# Patient Record
Sex: FEMALE | Race: BLACK OR AFRICAN AMERICAN | NOT HISPANIC OR LATINO | Employment: UNEMPLOYED | URBAN - NONMETROPOLITAN AREA
[De-identification: names, ages, dates, MRNs, and addresses within clinical notes are randomized per-mention and may not be internally consistent; named-entity substitution may affect disease eponyms.]

---

## 2022-11-22 ENCOUNTER — APPOINTMENT (OUTPATIENT)
Dept: ULTRASOUND IMAGING | Facility: HOSPITAL | Age: 27
End: 2022-11-22

## 2022-11-22 ENCOUNTER — HOSPITAL ENCOUNTER (EMERGENCY)
Facility: HOSPITAL | Age: 27
Discharge: HOME | End: 2022-11-22
Attending: EMERGENCY MEDICINE | Admitting: FAMILY MEDICINE

## 2022-11-22 VITALS
OXYGEN SATURATION: 98 % | TEMPERATURE: 98.9 F | BODY MASS INDEX: 23.39 KG/M2 | SYSTOLIC BLOOD PRESSURE: 115 MMHG | RESPIRATION RATE: 18 BRPM | HEART RATE: 77 BPM | DIASTOLIC BLOOD PRESSURE: 71 MMHG | WEIGHT: 137 LBS | HEIGHT: 64 IN

## 2022-11-22 DIAGNOSIS — R11.10 HYPEREMESIS: ICD-10-CM

## 2022-11-22 DIAGNOSIS — F12.10 CANNABIS ABUSE: ICD-10-CM

## 2022-11-22 DIAGNOSIS — Z34.90 EARLY STAGE OF PREGNANCY: Primary | ICD-10-CM

## 2022-11-22 DIAGNOSIS — R11.2 NAUSEA AND VOMITING, UNSPECIFIED VOMITING TYPE: ICD-10-CM

## 2022-11-22 LAB
ALBUMIN SERPL-MCNC: 4.8 G/DL (ref 3.5–5.2)
ALBUMIN/GLOB SERPL: 1.5 G/DL
ALP SERPL-CCNC: 72 U/L (ref 39–117)
ALT SERPL W P-5'-P-CCNC: 11 U/L (ref 1–33)
AMPHET+METHAMPHET UR QL: NEGATIVE
AMPHETAMINES UR QL: NEGATIVE
ANION GAP SERPL CALCULATED.3IONS-SCNC: 19 MMOL/L (ref 5–15)
AST SERPL-CCNC: 15 U/L (ref 1–32)
B-HCG UR QL: POSITIVE
BACTERIA UR QL AUTO: ABNORMAL /HPF
BARBITURATES UR QL SCN: NEGATIVE
BASOPHILS # BLD AUTO: 0.02 10*3/MM3 (ref 0–0.2)
BASOPHILS NFR BLD AUTO: 0.2 % (ref 0–1.5)
BENZODIAZ UR QL SCN: NEGATIVE
BILIRUB SERPL-MCNC: 0.5 MG/DL (ref 0–1.2)
BILIRUB UR QL STRIP: NEGATIVE
BUN SERPL-MCNC: 10 MG/DL (ref 6–20)
BUN/CREAT SERPL: 14.1 (ref 7–25)
BUPRENORPHINE SERPL-MCNC: NEGATIVE NG/ML
CALCIUM SPEC-SCNC: 10.2 MG/DL (ref 8.6–10.5)
CANNABINOIDS SERPL QL: POSITIVE
CHLORIDE SERPL-SCNC: 98 MMOL/L (ref 98–107)
CLARITY UR: CLEAR
CO2 SERPL-SCNC: 18 MMOL/L (ref 22–29)
COCAINE UR QL: NEGATIVE
COLOR UR: YELLOW
CREAT SERPL-MCNC: 0.71 MG/DL (ref 0.57–1)
DEPRECATED RDW RBC AUTO: 39.7 FL (ref 37–54)
EGFRCR SERPLBLD CKD-EPI 2021: 118.9 ML/MIN/1.73
EOSINOPHIL # BLD AUTO: 0.03 10*3/MM3 (ref 0–0.4)
EOSINOPHIL NFR BLD AUTO: 0.3 % (ref 0.3–6.2)
ERYTHROCYTE [DISTWIDTH] IN BLOOD BY AUTOMATED COUNT: 12.9 % (ref 12.3–15.4)
GLOBULIN UR ELPH-MCNC: 3.2 GM/DL
GLUCOSE SERPL-MCNC: 94 MG/DL (ref 65–99)
GLUCOSE UR STRIP-MCNC: NEGATIVE MG/DL
HCG INTACT+B SERPL-ACNC: NORMAL MIU/ML
HCT VFR BLD AUTO: 43.8 % (ref 34–46.6)
HGB BLD-MCNC: 14.6 G/DL (ref 12–15.9)
HGB UR QL STRIP.AUTO: NEGATIVE
HYALINE CASTS UR QL AUTO: ABNORMAL /LPF
IMM GRANULOCYTES # BLD AUTO: 0.03 10*3/MM3 (ref 0–0.05)
IMM GRANULOCYTES NFR BLD AUTO: 0.3 % (ref 0–0.5)
KETONES UR QL STRIP: ABNORMAL
LEUKOCYTE ESTERASE UR QL STRIP.AUTO: NEGATIVE
LYMPHOCYTES # BLD AUTO: 2.31 10*3/MM3 (ref 0.7–3.1)
LYMPHOCYTES NFR BLD AUTO: 24.7 % (ref 19.6–45.3)
MCH RBC QN AUTO: 28.2 PG (ref 26.6–33)
MCHC RBC AUTO-ENTMCNC: 33.3 G/DL (ref 31.5–35.7)
MCV RBC AUTO: 84.7 FL (ref 79–97)
METHADONE UR QL SCN: NEGATIVE
MONOCYTES # BLD AUTO: 1.13 10*3/MM3 (ref 0.1–0.9)
MONOCYTES NFR BLD AUTO: 12.1 % (ref 5–12)
NEUTROPHILS NFR BLD AUTO: 5.85 10*3/MM3 (ref 1.7–7)
NEUTROPHILS NFR BLD AUTO: 62.4 % (ref 42.7–76)
NITRITE UR QL STRIP: NEGATIVE
NRBC BLD AUTO-RTO: 0 /100 WBC (ref 0–0.2)
OPIATES UR QL: NEGATIVE
OXYCODONE UR QL SCN: NEGATIVE
PCP UR QL SCN: NEGATIVE
PH UR STRIP.AUTO: 5.5 [PH] (ref 5–8)
PLATELET # BLD AUTO: 390 10*3/MM3 (ref 140–450)
PMV BLD AUTO: 9.2 FL (ref 6–12)
POTASSIUM SERPL-SCNC: 3.9 MMOL/L (ref 3.5–5.2)
PROPOXYPH UR QL: NEGATIVE
PROT SERPL-MCNC: 8 G/DL (ref 6–8.5)
PROT UR QL STRIP: ABNORMAL
RBC # BLD AUTO: 5.17 10*6/MM3 (ref 3.77–5.28)
RBC # UR STRIP: ABNORMAL /HPF
REF LAB TEST METHOD: ABNORMAL
SODIUM SERPL-SCNC: 135 MMOL/L (ref 136–145)
SP GR UR STRIP: >=1.03 (ref 1–1.03)
SQUAMOUS #/AREA URNS HPF: ABNORMAL /HPF
TRICYCLICS UR QL SCN: NEGATIVE
UROBILINOGEN UR QL STRIP: ABNORMAL
WBC # UR STRIP: ABNORMAL /HPF
WBC NRBC COR # BLD: 9.37 10*3/MM3 (ref 3.4–10.8)

## 2022-11-22 PROCEDURE — 96374 THER/PROPH/DIAG INJ IV PUSH: CPT

## 2022-11-22 PROCEDURE — 80053 COMPREHEN METABOLIC PANEL: CPT | Performed by: PHYSICIAN ASSISTANT

## 2022-11-22 PROCEDURE — 80306 DRUG TEST PRSMV INSTRMNT: CPT | Performed by: PHYSICIAN ASSISTANT

## 2022-11-22 PROCEDURE — 81001 URINALYSIS AUTO W/SCOPE: CPT | Performed by: PHYSICIAN ASSISTANT

## 2022-11-22 PROCEDURE — 99283 EMERGENCY DEPT VISIT LOW MDM: CPT

## 2022-11-22 PROCEDURE — 81025 URINE PREGNANCY TEST: CPT | Performed by: PHYSICIAN ASSISTANT

## 2022-11-22 PROCEDURE — 84702 CHORIONIC GONADOTROPIN TEST: CPT | Performed by: PHYSICIAN ASSISTANT

## 2022-11-22 PROCEDURE — 76817 TRANSVAGINAL US OBSTETRIC: CPT

## 2022-11-22 PROCEDURE — 85025 COMPLETE CBC W/AUTO DIFF WBC: CPT | Performed by: PHYSICIAN ASSISTANT

## 2022-11-22 PROCEDURE — 25010000002 ONDANSETRON PER 1 MG: Performed by: PHYSICIAN ASSISTANT

## 2022-11-22 PROCEDURE — 96361 HYDRATE IV INFUSION ADD-ON: CPT

## 2022-11-22 RX ORDER — ONDANSETRON 2 MG/ML
4 INJECTION INTRAMUSCULAR; INTRAVENOUS ONCE
Status: COMPLETED | OUTPATIENT
Start: 2022-11-22 | End: 2022-11-22

## 2022-11-22 RX ORDER — ONDANSETRON 4 MG/1
4 TABLET, FILM COATED ORAL EVERY 6 HOURS PRN
Qty: 12 TABLET | Refills: 0 | Status: SHIPPED | OUTPATIENT
Start: 2022-11-22 | End: 2022-11-22

## 2022-11-22 RX ORDER — DOXYLAMINE SUCCINATE AND PYRIDOXINE HYDROCHLORIDE, DELAYED RELEASE TABLETS 10 MG/10 MG 10; 10 MG/1; MG/1
2 TABLET, DELAYED RELEASE ORAL NIGHTLY PRN
Qty: 7 TABLET | Refills: 0 | Status: SHIPPED | OUTPATIENT
Start: 2022-11-22 | End: 2022-11-29

## 2022-11-22 RX ADMIN — ONDANSETRON 4 MG: 2 INJECTION INTRAMUSCULAR; INTRAVENOUS at 17:28

## 2022-11-22 RX ADMIN — SODIUM CHLORIDE 1000 ML: 9 INJECTION, SOLUTION INTRAVENOUS at 17:28

## 2022-11-22 NOTE — ED PROVIDER NOTES
"Subjective  History of Present Illness:    Chief Complaint: Nausea vomiting  History of Present Illness: 28-year-old female presents with nausea vomiting, and early pregnancy.  She has been having nausea vomiting for 2 weeks.  She has had 3 pregnancy test at home, but has not had an OB appointment yet.  She denies abdominal pain, no vaginal bleeding.  She states she has vomited multiple times a day sometimes up to 20 times.  Onset: Gradual  Duration: 2 weeks  Exacerbating / Alleviating factors: Eating and drinking, pregnancy  Associated symptoms: Early pregnancy      Nurses Notes reviewed and agree, including vitals, allergies, social history and prior medical history.     REVIEW OF SYSTEMS: All systems reviewed and not pertinent unless noted.    Review of Systems   Gastrointestinal: Positive for nausea and vomiting.   All other systems reviewed and are negative.      No past medical history on file.    Allergies:    Patient has no known allergies.      No past surgical history on file.      Social History     Socioeconomic History   • Marital status: Single         No family history on file.    Objective  Physical Exam:  /71   Pulse 77   Temp 98.9 °F (37.2 °C) (Oral)   Resp 18   Ht 162.6 cm (64\")   Wt 62.1 kg (137 lb)   LMP 10/12/2022   SpO2 98%   BMI 23.52 kg/m²      Physical Exam  Vitals and nursing note reviewed.   Constitutional:       Appearance: She is well-developed.   HENT:      Head: Normocephalic and atraumatic.      Mouth/Throat:      Mouth: Mucous membranes are dry.   Eyes:      Extraocular Movements: Extraocular movements intact.   Cardiovascular:      Rate and Rhythm: Normal rate and regular rhythm.   Pulmonary:      Effort: Pulmonary effort is normal.      Breath sounds: Normal breath sounds.   Abdominal:      Palpations: Abdomen is soft.   Musculoskeletal:         General: Normal range of motion.      Cervical back: Normal range of motion and neck supple.   Skin:     General: Skin is " warm and dry.   Neurological:      Mental Status: She is alert and oriented to person, place, and time.      Deep Tendon Reflexes: Reflexes are normal and symmetric.           Procedures    ED Course:    ED Course as of 11/22/22 1944 Tue Nov 22, 2022 1940 discussed the case with Dr. Rodriguez, patient needs to call the office for earlier appointment. [CS]      ED Course User Index  [CS] Guillermo Peck Jr., PASHARAL       Lab Results (last 24 hours)     Procedure Component Value Units Date/Time    Urinalysis With Culture If Indicated - Urine, Clean Catch [[id number]]  (Abnormal) Collected: 11/22/22 1718    Specimen: Urine, Clean Catch Updated: 11/22/22 1730     Color, UA Yellow     Appearance, UA Clear     pH, UA 5.5     Specific Gravity, UA >=1.030     Glucose, UA Negative     Ketones, UA >=160 mg/dL (4+)     Bilirubin, UA Negative     Blood, UA Negative     Protein, UA 30 mg/dL (1+)     Leuk Esterase, UA Negative     Nitrite, UA Negative     Urobilinogen, UA 0.2 E.U./dL    Narrative:      In absence of clinical symptoms, the presence of pyuria, bacteria, and/or nitrites on the urinalysis result does not correlate with infection.    Pregnancy, Urine - Urine, Clean Catch [[id number]]  (Abnormal) Collected: 11/22/22 1718    Specimen: Urine, Clean Catch Updated: 11/22/22 1731     HCG, Urine QL Positive    Urinalysis, Microscopic Only - Urine, Clean Catch [[id number]]  (Abnormal) Collected: 11/22/22 1718    Specimen: Urine, Clean Catch Updated: 11/22/22 1738     RBC, UA None Seen /HPF      WBC, UA 0-2 /HPF      Comment: Urine culture not indicated.        Bacteria, UA Trace /HPF      Squamous Epithelial Cells, UA 3-6 /HPF      Hyaline Casts, UA None Seen /LPF      Methodology Manual Light Microscopy    Urine Drug Screen - Urine, Clean Catch [[id number]]  (Abnormal) Collected: 11/22/22 1718    Specimen: Urine, Clean Catch Updated: 11/22/22 1753     THC, Screen, Urine Positive     Phencyclidine (PCP), Urine Negative     Cocaine  Screen, Urine Negative     Methamphetamine, Ur Negative     Opiate Screen Negative     Amphetamine Screen, Urine Negative     Benzodiazepine Screen, Urine Negative     Tricyclic Antidepressants Screen Negative     Methadone Screen, Urine Negative     Barbiturates Screen, Urine Negative     Oxycodone Screen, Urine Negative     Propoxyphene Screen Negative     Buprenorphine, Screen, Urine Negative    Narrative:      Limitations of this procedure include the possibility of false positives due to interfering substances in the urine sample. Clinical data should be correlated with any questionable result. Positive results should be considered Presumptive Positive until results are confirmed with another methodology such as HPLC or GCMS.    Comprehensive Metabolic Panel [[id number]]  (Abnormal) Collected: 11/22/22 1728    Specimen: Blood Updated: 11/22/22 1758     Glucose 94 mg/dL      BUN 10 mg/dL      Creatinine 0.71 mg/dL      Sodium 135 mmol/L      Potassium 3.9 mmol/L      Chloride 98 mmol/L      CO2 18.0 mmol/L      Calcium 10.2 mg/dL      Total Protein 8.0 g/dL      Albumin 4.80 g/dL      ALT (SGPT) 11 U/L      AST (SGOT) 15 U/L      Alkaline Phosphatase 72 U/L      Total Bilirubin 0.5 mg/dL      Globulin 3.2 gm/dL      A/G Ratio 1.5 g/dL      BUN/Creatinine Ratio 14.1     Anion Gap 19.0 mmol/L      eGFR 118.9 mL/min/1.73      Comment: National Kidney Foundation and American Society of Nephrology (ASN) Task Force recommended calculation based on the Chronic Kidney Disease Epidemiology Collaboration (CKD-EPI) equation refit without adjustment for race.       Narrative:      GFR Normal >60  Chronic Kidney Disease <60  Kidney Failure <15      CBC Auto Differential [[id number]]  (Abnormal) Collected: 11/22/22 1728    Specimen: Blood Updated: 11/22/22 1734     WBC 9.37 10*3/mm3      RBC 5.17 10*6/mm3      Hemoglobin 14.6 g/dL      Hematocrit 43.8 %      MCV 84.7 fL      MCH 28.2 pg      MCHC 33.3 g/dL      RDW 12.9 %       RDW-SD 39.7 fl      MPV 9.2 fL      Platelets 390 10*3/mm3      Neutrophil % 62.4 %      Lymphocyte % 24.7 %      Monocyte % 12.1 %      Eosinophil % 0.3 %      Basophil % 0.2 %      Immature Grans % 0.3 %      Neutrophils, Absolute 5.85 10*3/mm3      Lymphocytes, Absolute 2.31 10*3/mm3      Monocytes, Absolute 1.13 10*3/mm3      Eosinophils, Absolute 0.03 10*3/mm3      Basophils, Absolute 0.02 10*3/mm3      Immature Grans, Absolute 0.03 10*3/mm3      nRBC 0.0 /100 WBC     hCG, Quantitative, Pregnancy [[id number]] Collected: 11/22/22 1728    Specimen: Blood Updated: 11/22/22 1845     HCG Quantitative 69,839.00 mIU/mL     Narrative:      HCG Ranges by Gestational Age    Females - non-pregnant premenopausal   </= 1mIU/mL HCG  Females - postmenopausal               </= 7mIU/mL HCG    3 Weeks         5.8 -    71.2 mIU/mL  4 Weeks         9.5 -     750 mIU/mL  5 Weeks         217 -   7,138 mIU/mL  6 Weeks         158 -  31,795 mIU/mL  7 Weeks       3,697 - 163,563 mIU/mL  8 Weeks      32,065 - 149,571 mIU/mL  9 Weeks      63,803 - 151,410 mIU/mL  10 Weeks     46,509 - 186,977 mIU/mL  12 Weeks     27,832 - 210,612 mIU/mL  14 Weeks     13,950 -  62,530 mIU/mL  15 Weeks     12,039 -  70,971 mIU/mL  16 Weeks      9,040 -  56,451 mIU/mL  17 Weeks      8,175 -  55,868 mIU/mL  18 Weeks      8,099 -  58,176 mIU/mL  Results may be falsely decreased if patient taking Biotin.             No radiology results from the last 24 hrs       MDM  Number of Diagnoses or Management Options  Cannabis abuse: new and requires workup  Early stage of pregnancy: new and requires workup  Hyperemesis: new and requires workup  Nausea and vomiting, unspecified vomiting type: new and requires workup     Amount and/or Complexity of Data Reviewed  Clinical lab tests: reviewed  Tests in the radiology section of CPT®: reviewed  Review and summarize past medical records: yes  Discuss the patient with other providers: yes    Risk of Complications, Morbidity,  and/or Mortality  Presenting problems: moderate  Diagnostic procedures: moderate  Management options: moderate  General comments: 27-year-old female presents with nausea vomiting for 2 weeks in early pregnancy, labs ordered, IV ordered, given normal saline and antiemetics.  Reviewed labs and interpreted myself, discussed with the patient at bedside.  Transvaginal ultrasound also ordered, reviewed the results and discussed with the patient at bedside discussed the results of the ultrasound with the patient at the bedside as well as labs, patient had a low CO2, and elevated ketones in urine was likely for malnutrition and dehydration, she did receive a liter of fluids.  Discussed the case with Dr. Rodriguez, she recommended the patient follow-up next week for an earlier appointment she feels much better, educated her on hyperemesis cannabis, and the effects of marijuana during pregnancy.  She is stable and can be discharged    Patient Progress  Patient progress: stable        Final diagnoses:   Early stage of pregnancy   Hyperemesis   Nausea and vomiting, unspecified vomiting type   Cannabis abuse        Guillermo Peck Jr., PA-C  11/22/22 1944

## 2023-01-09 LAB
C TRACH RRNA SPEC DONR QL NAA+PROBE: NEGATIVE
EXTERNAL ABO GROUPING: NORMAL
EXTERNAL ANTIBODY SCREEN: NORMAL
EXTERNAL HEMATOCRIT: 32 %
EXTERNAL HEMOGLOBIN: 10.9 G/DL
EXTERNAL HEPATITIS B SURFACE ANTIGEN: NEGATIVE
EXTERNAL PLATELET COUNT: 355 K/ΜL
EXTERNAL RH FACTOR: POSITIVE
EXTERNAL SYPHILIS RPR SCREEN: NORMAL
HCV AB S/CO SERPL IA: NEGATIVE
HIV 1+2 AB+HIV1 P24 AG SERPL QL IA: NORMAL
N GONORRHOEA DNA SPEC QL NAA+PROBE: NEGATIVE
RUBV IGG SERPL IA-ACNC: >10

## 2023-02-28 ENCOUNTER — INITIAL PRENATAL (OUTPATIENT)
Dept: OBSTETRICS AND GYNECOLOGY | Facility: CLINIC | Age: 28
End: 2023-02-28
Payer: COMMERCIAL

## 2023-02-28 VITALS — SYSTOLIC BLOOD PRESSURE: 110 MMHG | DIASTOLIC BLOOD PRESSURE: 72 MMHG | WEIGHT: 156 LBS | BODY MASS INDEX: 26.78 KG/M2

## 2023-02-28 DIAGNOSIS — Z30.46 ENCOUNTER FOR NEXPLANON REMOVAL: ICD-10-CM

## 2023-02-28 DIAGNOSIS — Z87.59 HISTORY OF PRIOR PREGNANCY WITH IUGR NEWBORN: ICD-10-CM

## 2023-02-28 DIAGNOSIS — Z34.92 PRENATAL CARE IN SECOND TRIMESTER: Primary | ICD-10-CM

## 2023-02-28 PROCEDURE — 11982 REMOVE DRUG IMPLANT DEVICE: CPT | Performed by: OBSTETRICS & GYNECOLOGY

## 2023-02-28 PROCEDURE — 99204 OFFICE O/P NEW MOD 45 MIN: CPT | Performed by: OBSTETRICS & GYNECOLOGY

## 2023-02-28 RX ORDER — PRENATAL VIT NO.126/IRON/FOLIC 28MG-0.8MG
TABLET ORAL DAILY
COMMUNITY

## 2023-03-03 PROBLEM — Z87.59 HISTORY OF PRIOR PREGNANCY WITH IUGR NEWBORN: Status: ACTIVE | Noted: 2023-03-03

## 2023-03-03 NOTE — PROGRESS NOTES
New Pregnancy Visit    Subjective   Chief Complaint   Patient presents with   • Initial Prenatal Visit     Transfer of care, Anatomy scan done today. No complaints       Say Bonilla is a 27 y.o. year old .  Patient's last menstrual period was 10/12/2022.  She presents to initiate prenatal care with our group today.     [place]  Anatomy ultrasound today. Uncomplicated prenatal care thus far.  Pregnancy is dated by 7-week ultrasound in our ED.  She does have a Nexplanon in place on the left that is deep and has been present for roughly 10 years at this point.  Previous term vaginal birth in .  That baby had growth restriction weighing 5 pounds 6 ounces.    Social History    Tobacco Use      Smoking status: Every Day        Packs/day: 0.25        Types: Cigarettes      Smokeless tobacco: Never      Current Outpatient Medications on File Prior to Visit   Medication Sig Dispense Refill   • prenatal vitamin (prenatal, CLASSIC, vitamin) tablet Take  by mouth Daily.       No current facility-administered medications on file prior to visit.          Objective   /72   Wt 70.8 kg (156 lb)   LMP 10/12/2022   BMI 26.78 kg/m²   Physical Exam:  Normal, gestational age-appropriate exam today        Medical Decision Making:    Lab Review:   Prenatal labs reviewed    Note Review:  None    Imaging Review:  Pelvic ultrasound reports 22 and today  IUP at 20+6 weeks. Anatomy was completely cleared today and all organ systems were found to be normal in appearance. EFW 398g(57%) AC 36%. Cephalic presentation. Placenta is anterior. Amniotic fluid and fetal heart rate are normal.  Assessment   1. IUP at 20+6 weeks  2. Supervision of high risk pregnancy   3. Transfer of prenatal care  4. Deeply embedded Nexplanon past due for removal, removed today  5. History of IUGR with previous pregnancy     Plan    1. The problem list for pregnancy was initiated today  2. Tests/Orders/Rx  for today:  Orders Placed This Encounter   Procedures   • Chlamydia trachomatis, Neisseria gonorrhoeae, PCR - ,     This is an external result entered through the Results Console.     Order Specific Question:   Release to patient     Answer:   Routine Release   • HIV-1 / O / 2 Ag / Antibody 4th Generation     This is an external result entered through the Results Console.     Order Specific Question:   Release to patient     Answer:   Routine Release   • Obstetric Panel     This is an external result entered through the Results Console.     Order Specific Question:   Release to patient     Answer:   Routine Release   • Hepatitis C Antibody     This is an external result entered through the Results Console.     Order Specific Question:   Release to patient     Answer:   Routine Release       Medication Management: None    3. Testing for GC / Chlamydia / trichomonas was recently done and will not need to be repeated  4. Genetic testing reviewed: None  5. Information reviewed: smoking in pregnancy, exercise in pregnancy, nutrition in pregnancy, weight gain in pregnancy, work and travel restrictions during pregnancy, list of OTC medications acceptable in pregnancy and call coverage groups     Nexplanon Removal     Patient's last menstrual period was 10/12/2022.    Date of procedure:  02/28/2023    Risks and benefits discussed? yes  All questions answered? yes  Consents given by the patient  Written consent obtained? yes    Local anesthesia used:  yes - 2 cc's of  Meds; anesthesia local: 1% lidocaine with epinephrine    Procedure documentation:    The Nexplanon was barely palpable in the left arm. The upper left arm (non-dominant) was marked at the intended site of removal. An alcohol wipe was used to cleanse the skin.  Local anesthesia was injected. The arm was further cleaned with betadine. A vertical incision was created at the distal tip of the implant.  The implant was removed intact with some  difficulty.    Steri-strips were then placed across the site of insertion and the arm was wrapped.    She tolerated the procedure well.  There were no complications.  EBL was minimal.    Post procedure instructions: Remove the wrapping in 24 hours and the steri-strips in 5 days.  The patient has been advised to contact the office if she develops fever, redness, swelling, pain, or discharge occurs at the procedure site.      Follow up: 4 week(s)    Angel Denney MD  Obstetrics and Gynecology  [facility]

## 2023-03-16 ENCOUNTER — REFERRAL TRIAGE (OUTPATIENT)
Dept: LABOR AND DELIVERY | Facility: HOSPITAL | Age: 28
End: 2023-03-16
Payer: COMMERCIAL

## 2023-03-20 ENCOUNTER — ROUTINE PRENATAL (OUTPATIENT)
Dept: OBSTETRICS AND GYNECOLOGY | Facility: CLINIC | Age: 28
End: 2023-03-20
Payer: COMMERCIAL

## 2023-03-20 VITALS — WEIGHT: 160 LBS | SYSTOLIC BLOOD PRESSURE: 110 MMHG | BODY MASS INDEX: 27.46 KG/M2 | DIASTOLIC BLOOD PRESSURE: 64 MMHG

## 2023-03-20 DIAGNOSIS — Z34.82 ENCOUNTER FOR SUPERVISION OF OTHER NORMAL PREGNANCY IN SECOND TRIMESTER: Primary | ICD-10-CM

## 2023-03-20 PROCEDURE — 99213 OFFICE O/P EST LOW 20 MIN: CPT | Performed by: MIDWIFE

## 2023-03-20 NOTE — PROGRESS NOTES
Chief Complaint   Patient presents with   • Routine Prenatal Visit     No Complaints/concerns        HPI: Say is a  currently at 23w6d here for prenatal visit who reports the following:  Baby is active. She denies any problems                EXAM:     Vitals:    23 1336   BP: 110/64      Abdomen:   See prenatal flowsheet as noted and reviewed, soft, nontender   Pelvic:  See prenatal flowsheet as noted and reviewed   Urine:  See prenatal flowsheet as noted and reviewed    Lab Results   Component Value Date    ABO O 2023    RH Positive 2023    ABSCRN Normal 2023       MDM:  Impression: Supervision of low risk pregnancy   Tests done today: none   Topics discussed: kick counts and fetal movement  Reviewed OB labs   Tests next visit: GCT  HgB                RTO:                        4 weeks    This note was electronically signed.  Zulema Mandel, APRN  3/20/2023

## 2023-04-17 ENCOUNTER — ROUTINE PRENATAL (OUTPATIENT)
Dept: OBSTETRICS AND GYNECOLOGY | Facility: CLINIC | Age: 28
End: 2023-04-17
Payer: COMMERCIAL

## 2023-04-17 VITALS — WEIGHT: 158.8 LBS | DIASTOLIC BLOOD PRESSURE: 66 MMHG | BODY MASS INDEX: 27.26 KG/M2 | SYSTOLIC BLOOD PRESSURE: 108 MMHG

## 2023-04-17 DIAGNOSIS — Z3A.27 27 WEEKS GESTATION OF PREGNANCY: Primary | ICD-10-CM

## 2023-04-17 LAB
BASOPHILS # BLD AUTO: 0.01 10*3/MM3 (ref 0–0.2)
BASOPHILS NFR BLD AUTO: 0.1 % (ref 0–1.5)
EOSINOPHIL # BLD AUTO: 0.07 10*3/MM3 (ref 0–0.4)
EOSINOPHIL NFR BLD AUTO: 0.8 % (ref 0.3–6.2)
ERYTHROCYTE [DISTWIDTH] IN BLOOD BY AUTOMATED COUNT: 12.8 % (ref 12.3–15.4)
GLUCOSE 1H P 50 G GLC PO SERPL-MCNC: 192 MG/DL (ref 65–139)
HCT VFR BLD AUTO: 33.6 % (ref 34–46.6)
HGB BLD-MCNC: 11.3 G/DL (ref 12–15.9)
IMM GRANULOCYTES # BLD AUTO: 0.07 10*3/MM3 (ref 0–0.05)
IMM GRANULOCYTES NFR BLD AUTO: 0.8 % (ref 0–0.5)
LYMPHOCYTES # BLD AUTO: 2.08 10*3/MM3 (ref 0.7–3.1)
LYMPHOCYTES NFR BLD AUTO: 24.1 % (ref 19.6–45.3)
MCH RBC QN AUTO: 28.9 PG (ref 26.6–33)
MCHC RBC AUTO-ENTMCNC: 33.6 G/DL (ref 31.5–35.7)
MCV RBC AUTO: 85.9 FL (ref 79–97)
MONOCYTES # BLD AUTO: 0.67 10*3/MM3 (ref 0.1–0.9)
MONOCYTES NFR BLD AUTO: 7.8 % (ref 5–12)
NEUTROPHILS # BLD AUTO: 5.73 10*3/MM3 (ref 1.7–7)
NEUTROPHILS NFR BLD AUTO: 66.4 % (ref 42.7–76)
NRBC BLD AUTO-RTO: 0 /100 WBC (ref 0–0.2)
PLATELET # BLD AUTO: 267 10*3/MM3 (ref 140–450)
RBC # BLD AUTO: 3.91 10*6/MM3 (ref 3.77–5.28)
WBC # BLD AUTO: 8.63 10*3/MM3 (ref 3.4–10.8)

## 2023-04-17 RX ORDER — FERROUS SULFATE 325(65) MG
325 TABLET ORAL
Qty: 60 TABLET | Refills: 10 | Status: SHIPPED | OUTPATIENT
Start: 2023-04-17

## 2023-04-17 NOTE — PROGRESS NOTES
Chief Complaint   Patient presents with   • Routine Prenatal Visit     Prenatal visit with Glucola done today. No problems or concerns        HPI:   , 27w6d gestation reports doing well    ROS:  See Prenatal Episode/Flowsheet  /66   Wt 72 kg (158 lb 12.8 oz)   LMP 10/12/2022   BMI 27.26 kg/m²      EXAM:  EXTREMITIES:  No swelling-See Prenatal Episode/Flowsheet    ABDOMEN:  FHTs/Movement noted-See Prenatal Episode/Flowsheet    URINE GLUCOSE/PROTEIN:  See Prenatal Episode/Flowsheet    PELVIC EXAM:  See Prenatal Episode/Flowsheet  CV:  Lungs:  GYN:    MDM:    Lab Results   Component Value Date    HGB 10.9 2023    RUBELLAABIGG >10.00 2023    HEPBSAG Negative 2023    ABO O 2023    RH Positive 2023    ABSCRN Normal 2023    [id number] Non-Reactive 2023    HEPCVIRUSABY Negative 2023       U/S:US Ob 14 + Weeks Single or First Gestation (2023 14:09)      1. IUP 27w6d  2. Routine care   3. Glucola today  4. Supportive measures  5. 3 weeks

## 2023-04-17 NOTE — PROGRESS NOTES
Please send in glucometer with test strips and lancets, #120 each with 6 refills each.  Patient failed her sugar test.  She needs to start checking her blood sugars: Fasting, 1 hour after breakfast/lunch/dinner.  She needs to record these in a log-notebook.  She will need to come in in about a week to review her blood sugars.  Iron twice a day has been sent in for anemia.  Please get her in with diabetic counseling as well.  Thank you

## 2023-04-18 DIAGNOSIS — O24.410 DIET CONTROLLED GESTATIONAL DIABETES MELLITUS (GDM) IN SECOND TRIMESTER: Primary | ICD-10-CM

## 2023-04-18 RX ORDER — BLOOD-GLUCOSE METER
1 KIT MISCELLANEOUS DAILY
Qty: 1 EACH | Refills: 0 | Status: SHIPPED | OUTPATIENT
Start: 2023-04-18

## 2023-04-18 RX ORDER — SYRING-NEEDL,DISP,INSUL,0.3 ML 30 GX5/16"
1 SYRINGE, EMPTY DISPOSABLE MISCELLANEOUS 4 TIMES DAILY
Qty: 120 EACH | Refills: 2 | Status: SHIPPED | OUTPATIENT
Start: 2023-04-18

## 2023-04-24 ENCOUNTER — ROUTINE PRENATAL (OUTPATIENT)
Dept: OBSTETRICS AND GYNECOLOGY | Facility: CLINIC | Age: 28
End: 2023-04-24
Payer: COMMERCIAL

## 2023-04-24 VITALS — DIASTOLIC BLOOD PRESSURE: 60 MMHG | WEIGHT: 162.2 LBS | SYSTOLIC BLOOD PRESSURE: 110 MMHG | BODY MASS INDEX: 27.84 KG/M2

## 2023-04-24 DIAGNOSIS — O24.410 GDM (GESTATIONAL DIABETES MELLITUS), CLASS A1: ICD-10-CM

## 2023-04-24 DIAGNOSIS — R35.0 URINARY FREQUENCY: Primary | ICD-10-CM

## 2023-04-24 DIAGNOSIS — Z87.59 HISTORY OF PRIOR PREGNANCY WITH IUGR NEWBORN: ICD-10-CM

## 2023-04-24 LAB
BILIRUB BLD-MCNC: NEGATIVE MG/DL
CLARITY, POC: ABNORMAL
COLOR UR: YELLOW
GLUCOSE UR STRIP-MCNC: NEGATIVE MG/DL
KETONES UR QL: NEGATIVE
LEUKOCYTE EST, POC: NEGATIVE
NITRITE UR-MCNC: NEGATIVE MG/ML
PH UR: 8 [PH] (ref 5–8)
PROT UR STRIP-MCNC: ABNORMAL MG/DL
RBC # UR STRIP: NEGATIVE /UL
SP GR UR: 1.01 (ref 1–1.03)
UROBILINOGEN UR QL: NORMAL

## 2023-04-24 NOTE — PROGRESS NOTES
Chief Complaint   Patient presents with   • Routine Prenatal Visit     Prenatal visit with no problems or concerns.        HPI:   , 28w6d gestation reports doing well    ROS:  See Prenatal Episode/Flowsheet  /60   Wt 73.6 kg (162 lb 3.2 oz)   LMP 10/12/2022   BMI 27.84 kg/m²      EXAM:  EXTREMITIES:  No swelling-See Prenatal Episode/Flowsheet    ABDOMEN:  FHTs/Movement noted-See Prenatal Episode/Flowsheet    URINE GLUCOSE/PROTEIN:  See Prenatal Episode/Flowsheet    PELVIC EXAM:  See Prenatal Episode/Flowsheet  CV:  Lungs:  GYN:    MDM:    Lab Results   Component Value Date    HGB 11.3 (L) 2023    RUBELLAABIGG >10.00 2023    HEPBSAG Negative 2023    ABO O 2023    RH Positive 2023    ABSCRN Normal 2023    [id number] Non-Reactive 2023    HEPCVIRUSABY Negative 2023       U/S:US Ob 14 + Weeks Single or First Gestation (2023 14:09)      1. IUP 28w6d  2. Routine care   3. GDM: Patient brought log in for review today.  Few outliers most likely related to dietary choices.  Fastings are normal

## 2023-04-25 DIAGNOSIS — O24.410 GDM (GESTATIONAL DIABETES MELLITUS), CLASS A1: Primary | ICD-10-CM

## 2023-04-26 LAB
CREAT UR-MCNC: 52.8 MG/DL
PROT UR-MCNC: 30.6 MG/DL
PROT/CREAT UR: 579.5 MG/G CREA (ref 0–200)

## 2023-05-01 ENCOUNTER — ROUTINE PRENATAL (OUTPATIENT)
Dept: OBSTETRICS AND GYNECOLOGY | Facility: CLINIC | Age: 28
End: 2023-05-01
Payer: COMMERCIAL

## 2023-05-01 VITALS — BODY MASS INDEX: 28.12 KG/M2 | WEIGHT: 163.8 LBS | DIASTOLIC BLOOD PRESSURE: 68 MMHG | SYSTOLIC BLOOD PRESSURE: 104 MMHG

## 2023-05-01 DIAGNOSIS — Z34.93 THIRD TRIMESTER PREGNANCY: Primary | ICD-10-CM

## 2023-05-01 NOTE — PROGRESS NOTES
Chief Complaint   Patient presents with   • Routine Prenatal Visit     Prenatal visit with no problems or concerns        HPI:   , 29w6d gestation reports doing well    ROS:  See Prenatal Episode/Flowsheet  /68   Wt 74.3 kg (163 lb 12.8 oz)   LMP 10/12/2022   BMI 28.12 kg/m²      EXAM:  EXTREMITIES:  No swelling-See Prenatal Episode/Flowsheet    ABDOMEN:  FHTs/Movement noted-See Prenatal Episode/Flowsheet    URINE GLUCOSE/PROTEIN:  See Prenatal Episode/Flowsheet    PELVIC EXAM:  See Prenatal Episode/Flowsheet  CV:  Lungs:  GYN:    MDM:    Lab Results   Component Value Date    HGB 11.3 (L) 2023    RUBELLAABIGG >10.00 2023    HEPBSAG Negative 2023    ABO O 2023    RH Positive 2023    ABSCRN Normal 2023    [id number] Non-Reactive 2023    HEPCVIRUSABY Negative 2023       U/S:    1. IUP 29w6d  2. Routine care   3. GDM: has not been chekcing FSBs- was confused about coitnuing to hceck- instructed on continuing to check  4. Growth one week  5. Anemia: taking PNV and Fe

## 2023-05-08 ENCOUNTER — ROUTINE PRENATAL (OUTPATIENT)
Dept: OBSTETRICS AND GYNECOLOGY | Facility: CLINIC | Age: 28
End: 2023-05-08
Payer: COMMERCIAL

## 2023-05-08 VITALS — SYSTOLIC BLOOD PRESSURE: 106 MMHG | BODY MASS INDEX: 27.29 KG/M2 | DIASTOLIC BLOOD PRESSURE: 70 MMHG | WEIGHT: 159 LBS

## 2023-05-08 DIAGNOSIS — O09.93 ENCOUNTER FOR SUPERVISION OF HIGH RISK PREGNANCY IN THIRD TRIMESTER, ANTEPARTUM: Primary | ICD-10-CM

## 2023-05-08 DIAGNOSIS — O24.410 DIET CONTROLLED GESTATIONAL DIABETES MELLITUS (GDM) IN THIRD TRIMESTER: ICD-10-CM

## 2023-05-08 DIAGNOSIS — D50.9 IRON DEFICIENCY ANEMIA DURING PREGNANCY: ICD-10-CM

## 2023-05-08 DIAGNOSIS — O99.019 IRON DEFICIENCY ANEMIA DURING PREGNANCY: ICD-10-CM

## 2023-05-08 DIAGNOSIS — Z87.59 HISTORY OF PRIOR PREGNANCY WITH IUGR NEWBORN: ICD-10-CM

## 2023-05-09 NOTE — PROGRESS NOTES
Chief Complaint  Routine Prenatal Visit (No complaints. )    History of Present Illness:  Say is a  currently at 31w0d who presents today with no significant complaints.  She does report good fetal movement.  She has been taking her prenatal vitamins and iron supplements.  She has been monitoring her glucose levels.  Her fasting levels have been 87-92.  Her 1 hour postprandial levels have been 99 to less than 140.  She did have 1 elevated level of 187 with  food.    Exam:  Vitals:  See prenatal flowsheet as noted and reviewed  General: Alert, cooperative, and does not appear in any distress  Abdomen:   See prenatal flowsheet as noted and reviewed    Uterus gravid, non-tender; no palpable masses    No guarding or rebound tenderness  Pelvic:  See prenatal flowsheet as noted and reviewed  Ext:  See prenatal flowsheet as noted and reviewed    Moves extremities well, no cyanosis and no redness  Urine:  See prenatal flowsheet as noted and reviewed    Data Review:  The following data was reviewed by: Lore Rodriguez MD on 2023:  Prenatal Labs:  Lab Results   Component Value Date    HGB 11.3 (L) 2023    RUBELLAABIGG >10.00 2023    HEPBSAG Negative 2023    ABO O 2023    RH Positive 2023    ABSCRN Normal 2023    [id number] Non-Reactive 2023    HEPCVIRUSABY Negative 2023       Orders Only on 2023   Component Date Value   • Creatinine, Urine 2023 52.8    • Total Protein, Urine 2023 30.6    • Protein/Creatinine Ratio 2023 579.5 (H)    Routine Prenatal on 2023   Component Date Value   • Color 2023 Yellow    • Clarity, UA 2023 Cloudy (A)    • Glucose, UA 2023 Negative    • Bilirubin 2023 Negative    • Ketones, UA 2023 Negative    • Specific Gravity  2023 1.015    • Blood, UA 2023 Negative    • pH, Urine 2023 8.0    • Protein, POC 2023 Trace (A)    • Urobilinogen, UA 2023  Normal    • Leukocytes 2023 Negative    • Nitrite, UA 2023 Negative    Routine Prenatal on 2023   Component Date Value   • Gestational Diabetes Scr* 2023 192 (H)    • WBC 2023 8.63    • RBC 2023 3.91    • Hemoglobin 2023 11.3 (L)    • Hematocrit 2023 33.6 (L)    • MCV 2023 85.9    • MCH 2023 28.9    • MCHC 2023 33.6    • RDW 2023 12.8    • Platelets 2023 267    • Neutrophil Rel % 2023 66.4    • Lymphocyte Rel % 2023 24.1    • Monocyte Rel % 2023 7.8    • Eosinophil Rel % 2023 0.8    • Basophil Rel % 2023 0.1    • Neutrophils Absolute 2023 5.73    • Lymphocytes Absolute 2023 2.08    • Monocytes Absolute 2023 0.67    • Eosinophils Absolute 2023 0.07    • Basophils Absolute 2023 0.01    • Immature Granulocyte Rel* 2023 0.8 (H)    • Immature Grans Absolute 2023 0.07 (H)    • nRBC 2023 0.0      Imaging:  US Ob Follow Up Transabdominal Approach  Say Bonilla  : 1995  MRN: [id number]  Date: 2023    Reason for exam/History:  Growth, anemia, GDM    Ultrasound images are reviewed.  There is noted to be a viable   intrauterine pregnancy. The pregnancy is measuring 31 weeks 1 days   gestation.  The estimated fetal weight is 1700 grams at the 45.9% for   growth.  The HC was at the 24.5% and the AC was at the 51.5%.  The   amniotic fluid index was 17.14 cms.  The fetal heart rate was normal.     The infant was in the breech presentation.  The placental location was   noted to be anterior.      The exam limitations noted:  none    See ultrasound report for measurements and structures identified.    Lore Rodriguez MD, RDMS  [facility]  OB GYN Wing     Medical Records:  None    Assessment and Plan:  Problem List Items Addressed This Visit        Gravid and     History of prior pregnancy with IUGR   Patient will need to be followed  closely for IUGR.  She is to have scan for growth next visit.  Plan pending results.    Relevant Orders    US Ob Follow Up Transabdominal Approach   Other Visit Diagnoses     Encounter for supervision of high risk pregnancy in third trimester, antepartum    -  Primary  Topics discussed:     glucose management  iron supplementation  kick counts and fetal movement  PIH precautions   labor signs and symptoms  Scan for growth next visit as discussed    Relevant Orders    US Ob Follow Up Transabdominal Approach    Iron deficiency anemia during pregnancy      Patient is to continue her iron supplements as previously given.    Diet controlled gestational diabetes mellitus (GDM) in third trimester      Patient is to continue monitoring her glucose levels as discussed.  She is to bring her glucose diary with her next visit.  Parameters and guidelines have been discussed.    Relevant Orders    US Ob Follow Up Transabdominal Approach        Follow Up/Instructions:  Follow up as scheduled.  Patient was given instructions and counseling regarding her condition or for health maintenance advice. Please see specific information pulled into the AVS if appropriate.     Note: Speech recognition transcription software may have been used to dictate portions of this document.  An attempt at proofreading has been made though minor errors in transcription may still be present.    This note was electronically signed.  Lore Rodriguez M.D.

## 2023-05-22 ENCOUNTER — ROUTINE PRENATAL (OUTPATIENT)
Dept: OBSTETRICS AND GYNECOLOGY | Facility: CLINIC | Age: 28
End: 2023-05-22
Payer: COMMERCIAL

## 2023-05-22 VITALS — WEIGHT: 162.2 LBS | DIASTOLIC BLOOD PRESSURE: 66 MMHG | BODY MASS INDEX: 27.84 KG/M2 | SYSTOLIC BLOOD PRESSURE: 104 MMHG

## 2023-05-22 DIAGNOSIS — Z34.93 THIRD TRIMESTER PREGNANCY: Primary | ICD-10-CM

## 2023-05-22 NOTE — PROGRESS NOTES
Chief Complaint   Patient presents with   • Routine Prenatal Visit     Prenatal visit with no problems or concerns        HPI:   , 32w6d gestation reports doing well    ROS:  See Prenatal Episode/Flowsheet  /66   Wt 73.6 kg (162 lb 3.2 oz)   LMP 10/12/2022   BMI 27.84 kg/m²      EXAM:  EXTREMITIES:  No swelling-See Prenatal Episode/Flowsheet    ABDOMEN:  FHTs/Movement noted-See Prenatal Episode/Flowsheet    URINE GLUCOSE/PROTEIN:  See Prenatal Episode/Flowsheet    PELVIC EXAM:  See Prenatal Episode/Flowsheet  CV:  Lungs:  GYN:    MDM:    Lab Results   Component Value Date    HGB 11.3 (L) 2023    RUBELLAABIGG >10.00 2023    HEPBSAG Negative 2023    ABO O 2023    RH Positive 2023    ABSCRN Normal 2023    [id number] Non-Reactive 2023    HEPCVIRUSABY Negative 2023       U/S:US Ob Follow Up Transabdominal Approach (2023 14:22)      1. IUP 32w6d  2. Routine care   3. Repeat growth 35-36 weeks  4. a1 gDM: relates normal fSBS  5. Anemia; taking Fe and PNV

## 2023-06-05 ENCOUNTER — ROUTINE PRENATAL (OUTPATIENT)
Dept: OBSTETRICS AND GYNECOLOGY | Facility: CLINIC | Age: 28
End: 2023-06-05
Payer: COMMERCIAL

## 2023-06-05 VITALS — SYSTOLIC BLOOD PRESSURE: 106 MMHG | DIASTOLIC BLOOD PRESSURE: 68 MMHG | BODY MASS INDEX: 27.64 KG/M2 | WEIGHT: 161 LBS

## 2023-06-05 DIAGNOSIS — D50.9 IRON DEFICIENCY ANEMIA DURING PREGNANCY: ICD-10-CM

## 2023-06-05 DIAGNOSIS — K21.9 GASTROESOPHAGEAL REFLUX DISEASE WITHOUT ESOPHAGITIS: ICD-10-CM

## 2023-06-05 DIAGNOSIS — Z87.59 HISTORY OF PRIOR PREGNANCY WITH IUGR NEWBORN: ICD-10-CM

## 2023-06-05 DIAGNOSIS — R11.2 NAUSEA AND VOMITING, UNSPECIFIED VOMITING TYPE: ICD-10-CM

## 2023-06-05 DIAGNOSIS — O09.93 ENCOUNTER FOR SUPERVISION OF HIGH RISK PREGNANCY IN THIRD TRIMESTER, ANTEPARTUM: Primary | ICD-10-CM

## 2023-06-05 DIAGNOSIS — O99.019 IRON DEFICIENCY ANEMIA DURING PREGNANCY: ICD-10-CM

## 2023-06-05 DIAGNOSIS — O24.410 DIET CONTROLLED GESTATIONAL DIABETES MELLITUS (GDM) IN THIRD TRIMESTER: ICD-10-CM

## 2023-06-05 PROCEDURE — 99214 OFFICE O/P EST MOD 30 MIN: CPT | Performed by: OBSTETRICS & GYNECOLOGY

## 2023-06-05 RX ORDER — FAMOTIDINE 20 MG/1
20 TABLET, FILM COATED ORAL 2 TIMES DAILY
Qty: 60 TABLET | Refills: 5 | Status: SHIPPED | OUTPATIENT
Start: 2023-06-05

## 2023-06-05 RX ORDER — ONDANSETRON HYDROCHLORIDE 8 MG/1
8 TABLET, FILM COATED ORAL EVERY 8 HOURS PRN
Qty: 30 TABLET | Refills: 0 | Status: SHIPPED | OUTPATIENT
Start: 2023-06-05

## 2023-06-05 RX ORDER — FERROUS SULFATE 325(65) MG
325 TABLET ORAL
Qty: 60 TABLET | Refills: 3 | Status: SHIPPED | OUTPATIENT
Start: 2023-06-05

## 2023-06-06 NOTE — PROGRESS NOTES
Chief Complaint  Routine Prenatal Visit (No Complaints/concerns )    History of Present Illness:  Say is a  currently at 35w0d who presents today with complaints of nausea and vomiting.  Patient also has reflux.  She has continued to monitor her glucose levels.  Her fasting levels have been in the 80s.  Her 1 hour postprandial levels have been in the 130s.  She does report good fetal movement.  She is taking her prenatal vitamins.    Exam:  Vitals:  See prenatal flowsheet as noted and reviewed  General: Alert, cooperative, and does not appear in any distress  Abdomen:   See prenatal flowsheet as noted and reviewed    Uterus gravid, non-tender; no palpable masses    No guarding or rebound tenderness  Pelvic:  See prenatal flowsheet as noted and reviewed  Ext:  See prenatal flowsheet as noted and reviewed    Moves extremities well, no cyanosis and no redness  Urine:  See prenatal flowsheet as noted and reviewed    Data Review:  The following data was reviewed by: Lore Rodriguez MD on 2023:  Prenatal Labs:  Lab Results   Component Value Date    HGB 11.3 (L) 2023    RUBELLAABIGG >10.00 2023    HEPBSAG Negative 2023    ABO O 2023    RH Positive 2023    ABSCRN Normal 2023    [id number] Non-Reactive 2023    HEPCVIRUSABY Negative 2023       No visits with results within 1 Month(s) from this visit.   Latest known visit with results is:   Orders Only on 2023   Component Date Value    Creatinine, Urine 2023 52.8     Total Protein, Urine 2023 30.6     Protein/Creatinine Ratio 2023 579.5 (H)      Imaging:  US Ob Follow Up Transabdominal Approach  Say Bonilla  : 1995  MRN: [id number]  Date: 2023    Reason for exam/History:  Growth, anemia, GDM    Ultrasound images are reviewed.  There is noted to be a viable   intrauterine pregnancy. The pregnancy is measuring 31 weeks 1 days   gestation.  The estimated fetal weight is 1700 grams  at the 45.9% for   growth.  The HC was at the 24.5% and the AC was at the 51.5%.  The   amniotic fluid index was 17.14 cms.  The fetal heart rate was normal.     The infant was in the breech presentation.  The placental location was   noted to be anterior.      The exam limitations noted:  none    See ultrasound report for measurements and structures identified.    Lore Rodriguez MD, [facility]  OB GYN [place]     Medical Records:  None    Assessment and Plan:  Problem List Items Addressed This Visit          Gravid and     History of prior pregnancy with IUGR     Relevant Orders    US Ob Follow Up Transabdominal Approach    US Fetal Biophysical Profile;Without Non-Stress Testing     Other Visit Diagnoses       Encounter for supervision of high risk pregnancy in third trimester, antepartum    -  Primary  Topics discussed:     GERD management  iron supplementation  kick counts and fetal movement  PIH precautions   labor signs and symptoms  Scan for growth and biophysical profile next visit    Relevant Orders    US Ob Follow Up Transabdominal Approach    US Fetal Biophysical Profile;Without Non-Stress Testing    Iron deficiency anemia during pregnancy      Prescription is given for iron supplements.    Relevant Medications    ferrous sulfate 325 (65 FE) MG tablet    Diet controlled gestational diabetes mellitus (GDM) in third trimester      Patient is to continue monitoring her glucose levels as discussed.  Scan for growth and BPP next visit.    Relevant Orders    US Ob Follow Up Transabdominal Approach    US Fetal Biophysical Profile;Without Non-Stress Testing    Nausea and vomiting, unspecified vomiting type      Scription is given for Zofran as noted.  Instructions and precautions have been given.  Patient is to call if worsening and/or changes in her symptoms.    Relevant Medications    ondansetron (ZOFRAN) 8 MG tablet    Gastroesophageal reflux disease without  esophagitis      Prescription is given for Pepcid as noted.    Relevant Medications    famotidine (PEPCID) 20 MG tablet          Follow Up/Instructions:  Follow up as scheduled.  Patient was given instructions and counseling regarding her condition or for health maintenance advice. Please see specific information pulled into the AVS if appropriate.     Note: Speech recognition transcription software may have been used to dictate portions of this document.  An attempt at proofreading has been made though minor errors in transcription may still be present.    This note was electronically signed.  Lore Rodriguez M.D.

## 2023-06-07 ENCOUNTER — PATIENT OUTREACH (OUTPATIENT)
Dept: LABOR AND DELIVERY | Facility: HOSPITAL | Age: 28
End: 2023-06-07
Payer: COMMERCIAL

## 2023-06-07 NOTE — OUTREACH NOTE
[facility]  Unable to Reach       Questions/Answers      Flowsheet Row Responses   Pending Outreach Confirm Patient Interest   Call Attempt Second   Outcome Left message   Unable to reach comments: Closed program due to UTR.              Garry Roland RN  Maternity Nurse Navigator    6/7/2023, 15:14 EDT

## 2023-06-12 ENCOUNTER — ROUTINE PRENATAL (OUTPATIENT)
Dept: OBSTETRICS AND GYNECOLOGY | Facility: CLINIC | Age: 28
End: 2023-06-12
Payer: COMMERCIAL

## 2023-06-12 VITALS — WEIGHT: 164 LBS | BODY MASS INDEX: 28.15 KG/M2 | DIASTOLIC BLOOD PRESSURE: 60 MMHG | SYSTOLIC BLOOD PRESSURE: 108 MMHG

## 2023-06-12 DIAGNOSIS — Z34.93 THIRD TRIMESTER PREGNANCY: Primary | ICD-10-CM

## 2023-06-12 PROCEDURE — 99213 OFFICE O/P EST LOW 20 MIN: CPT | Performed by: OBSTETRICS & GYNECOLOGY

## 2023-06-12 NOTE — PROGRESS NOTES
Chief Complaint   Patient presents with    Routine Prenatal Visit     Growth and BPP done today, no complaints.        HPI:   , 35w6d gestation reports doing well    ROS:  See Prenatal Episode/Flowsheet  /60   Wt 74.4 kg (164 lb)   LMP 10/12/2022   BMI 28.15 kg/m²      EXAM:  EXTREMITIES:  No swelling-See Prenatal Episode/Flowsheet    ABDOMEN:  FHTs/Movement noted-See Prenatal Episode/Flowsheet    URINE GLUCOSE/PROTEIN:  See Prenatal Episode/Flowsheet    PELVIC EXAM:  See Prenatal Episode/Flowsheet  CV:  Lungs:  GYN:    MDM:    Lab Results   Component Value Date    HGB 11.3 (L) 2023    RUBELLAABIGG >10.00 2023    HEPBSAG Negative 2023    ABO O 2023    RH Positive 2023    ABSCRN Normal 2023    [id number] Non-Reactive 2023    HEPCVIRUSABY Negative 2023       U/S: Overall growth 46.7 percentile.  KATIE 19.1.  Transverse.  Anterior placenta.  BPP 8 out of 8    1. IUP 35w6d  2. Routine care   3. Anemia: taking Fe and PNV  4.  Position scan BPP 1 week  5. A1 GDM: FSBS WNL

## 2023-06-19 ENCOUNTER — ROUTINE PRENATAL (OUTPATIENT)
Dept: OBSTETRICS AND GYNECOLOGY | Facility: CLINIC | Age: 28
End: 2023-06-19
Payer: COMMERCIAL

## 2023-06-19 VITALS — BODY MASS INDEX: 28.32 KG/M2 | SYSTOLIC BLOOD PRESSURE: 110 MMHG | DIASTOLIC BLOOD PRESSURE: 64 MMHG | WEIGHT: 165 LBS

## 2023-06-19 DIAGNOSIS — D50.9 IRON DEFICIENCY ANEMIA DURING PREGNANCY: ICD-10-CM

## 2023-06-19 DIAGNOSIS — Z36.85 ANTENATAL SCREENING FOR STREPTOCOCCUS B: Primary | ICD-10-CM

## 2023-06-19 DIAGNOSIS — O99.019 MATERNAL ANEMIA IN PREGNANCY, ANTEPARTUM: ICD-10-CM

## 2023-06-19 DIAGNOSIS — O24.410 DIET CONTROLLED GESTATIONAL DIABETES MELLITUS (GDM) IN THIRD TRIMESTER: ICD-10-CM

## 2023-06-19 DIAGNOSIS — O99.019 IRON DEFICIENCY ANEMIA DURING PREGNANCY: ICD-10-CM

## 2023-06-19 PROCEDURE — 99213 OFFICE O/P EST LOW 20 MIN: CPT | Performed by: OBSTETRICS & GYNECOLOGY

## 2023-06-19 NOTE — PROGRESS NOTES
Chief Complaint   Patient presents with    Routine Prenatal Visit     BPP & GBS, no complaints.        HPI:   , 36w6d gestation reports doing well    ROS:  See Prenatal Episode/Flowsheet  /64   Wt 74.8 kg (165 lb)   LMP 10/12/2022   BMI 28.32 kg/m²      EXAM:  EXTREMITIES:  No swelling-See Prenatal Episode/Flowsheet    ABDOMEN:  FHTs/Movement noted-See Prenatal Episode/Flowsheet    URINE GLUCOSE/PROTEIN:  See Prenatal Episode/Flowsheet    PELVIC EXAM:  See Prenatal Episode/Flowsheet  CV:  Lungs:  GYN:    MDM:    Lab Results   Component Value Date    HGB 11.3 (L) 2023    RUBELLAABIGG >10.00 2023    HEPBSAG Negative 2023    ABO O 2023    RH Positive 2023    ABSCRN Normal 2023    [id number] Non-Reactive 2023    HEPCVIRUSABY Negative 2023       U/S: BPP 8/8, KATIE 21, Vertex      1. IUP 36w6d  2. Routine care   3. Anemia: Taking Fe and PNV

## 2023-06-21 LAB — GP B STREP DNA SPEC QL NAA+PROBE: NEGATIVE

## 2023-06-26 PROBLEM — O24.410 DIET CONTROLLED GESTATIONAL DIABETES MELLITUS (GDM) IN THIRD TRIMESTER: Status: ACTIVE | Noted: 2023-06-26

## 2023-06-30 PROBLEM — Z34.90 PREGNANCY: Status: ACTIVE | Noted: 2023-06-30

## 2023-08-14 ENCOUNTER — POSTPARTUM VISIT (OUTPATIENT)
Dept: OBSTETRICS AND GYNECOLOGY | Facility: CLINIC | Age: 28
End: 2023-08-14
Payer: COMMERCIAL

## 2023-08-14 VITALS
HEIGHT: 64 IN | SYSTOLIC BLOOD PRESSURE: 108 MMHG | WEIGHT: 143 LBS | DIASTOLIC BLOOD PRESSURE: 68 MMHG | BODY MASS INDEX: 24.41 KG/M2

## 2023-08-14 DIAGNOSIS — Z30.017 ENCOUNTER FOR INITIAL PRESCRIPTION OF IMPLANTABLE SUBDERMAL CONTRACEPTIVE: Primary | ICD-10-CM

## 2023-08-14 DIAGNOSIS — Z12.4 SCREENING FOR CERVICAL CANCER: ICD-10-CM

## 2023-08-14 PROBLEM — O24.410 DIET CONTROLLED GESTATIONAL DIABETES MELLITUS (GDM) IN THIRD TRIMESTER: Status: RESOLVED | Noted: 2023-06-26 | Resolved: 2023-08-14

## 2023-08-14 PROBLEM — Z34.90 PREGNANCY: Status: RESOLVED | Noted: 2023-06-30 | Resolved: 2023-08-14

## 2023-08-14 PROBLEM — Z87.59 HISTORY OF PRIOR PREGNANCY WITH IUGR NEWBORN: Status: RESOLVED | Noted: 2023-03-03 | Resolved: 2023-08-14

## 2023-08-14 PROCEDURE — 0503F POSTPARTUM CARE VISIT: CPT | Performed by: MIDWIFE

## 2023-08-14 NOTE — PROGRESS NOTES
"History  Chief Complaint   Patient presents with    Postpartum Care     No Complaints/concerns         Say Bonilla is a 27 y.o. year old  presenting to be seen for her postpartum visit.  She had a vaginal delivery.    Since delivery she has been sexually active. She has used condoms.  She does not have concerns about post-partum blues/depression.   She is bottle feeding.  For ongoing contraception, her plans are Nexplanon.  She has had a menstrual cycle. It was last week and lasted about 3 days         Physical  /68   Ht 162.6 cm (64\")   Wt 64.9 kg (143 lb)   LMP 10/12/2022   Breastfeeding No   BMI 24.55 kg/mý     General:  well developed; well nourished  no acute distress   Abdomen: soft, non-tender; no masses   Pelvis: External genitalia:  normal appearance of the external genitalia including [place]'s and [place]'s glands.  Uterus:  normal size, shape and consistency.  Adnexa:  normal bimanual exam of the adnexa.        Assessment   Normal 6 week postpartum exam  Contraceptive management     Plan   BC options reviewed and compared today: Nexplanon  Pap smear done  Follow up for  Nexplanon insertion with menses    No orders of the defined types were placed in this encounter.         This note was electronically signed.  Zulema Mandel, YU  2023  "

## 2023-08-15 LAB — REF LAB TEST METHOD: NORMAL

## 2023-09-06 ENCOUNTER — OFFICE VISIT (OUTPATIENT)
Dept: OBSTETRICS AND GYNECOLOGY | Facility: CLINIC | Age: 28
End: 2023-09-06
Payer: COMMERCIAL

## 2023-09-06 VITALS
HEIGHT: 64 IN | DIASTOLIC BLOOD PRESSURE: 70 MMHG | WEIGHT: 144 LBS | BODY MASS INDEX: 24.59 KG/M2 | SYSTOLIC BLOOD PRESSURE: 110 MMHG

## 2023-09-06 DIAGNOSIS — Z30.017 NEXPLANON INSERTION: Primary | ICD-10-CM

## 2023-09-06 PROBLEM — Z97.5 NEXPLANON IN PLACE: Status: ACTIVE | Noted: 2023-09-06

## 2023-09-06 LAB
B-HCG UR QL: NEGATIVE
EXPIRATION DATE: NORMAL
INTERNAL NEGATIVE CONTROL: NORMAL
INTERNAL POSITIVE CONTROL: NORMAL
Lab: NORMAL

## 2023-09-06 RX ORDER — ETONOGESTREL 68 MG/1
IMPLANT SUBCUTANEOUS
COMMUNITY
Start: 2023-08-15

## 2023-09-06 NOTE — PROGRESS NOTES
Nexplanon Insertion    No LMP recorded.    Date of procedure:  9/6/2023    Risks and benefits discussed? yes  All questions answered? yes  Consents given by the patient  Written consent obtained? yes    Local anesthesia used:  yes - 2 cc's of  Meds; anesthesia local: 1% lidocaine with epinephrine    Procedure documentation:    The upper left arm (non-dominant) was marked at the intended site of insertion. An alcohol wipe was used to cleanse the skin.  Local anesthesia was injected. The arm was further cleaned with betadine. The Nexplanon was placed subdermally without difficulty.  The device was able to be palpated in the arm by both myself and Justice.  Steri-strips were then placed across the site of insertion and the arm was wrapped.    She tolerated the procedure well.  There were no complications.  EBL was minimal.    Post procedure instructions: Remove the wrapping in 24 hours and the steri-strips in 5 days.  The patient has been advised to contact the office if she develops fever, redness, swelling, pain, or discharge occurs at the procedure site.  She has been counseled on the effectiveness of her contraception as well.    Follow up needed: JOSE Denney MD  Obstetrics and Gynecology  [facility]